# Patient Record
Sex: FEMALE | Race: OTHER | Employment: OTHER | URBAN - METROPOLITAN AREA
[De-identification: names, ages, dates, MRNs, and addresses within clinical notes are randomized per-mention and may not be internally consistent; named-entity substitution may affect disease eponyms.]

---

## 2017-12-06 ENCOUNTER — ESTABLISHED COMPREHENSIVE EXAM (OUTPATIENT)
Dept: URBAN - METROPOLITAN AREA CLINIC 46 | Facility: CLINIC | Age: 72
End: 2017-12-06

## 2017-12-06 DIAGNOSIS — H40.1133: ICD-10-CM

## 2017-12-06 DIAGNOSIS — E11.9: ICD-10-CM

## 2017-12-06 DIAGNOSIS — H25.813: ICD-10-CM

## 2017-12-06 PROCEDURE — 92083 EXTENDED VISUAL FIELD XM: CPT

## 2017-12-06 PROCEDURE — 92014 COMPRE OPH EXAM EST PT 1/>: CPT

## 2017-12-06 PROCEDURE — 92134 CPTRZ OPH DX IMG PST SGM RTA: CPT

## 2017-12-06 PROCEDURE — 92015 DETERMINE REFRACTIVE STATE: CPT

## 2017-12-06 ASSESSMENT — VISUAL ACUITY
OS_SC: J5
OD_SC: J5
OD_BAT: 20/200
OD_SC: 20/40
OS_BAT: 20/200
OS_SC: 20/40-1

## 2017-12-06 ASSESSMENT — TONOMETRY
OD_IOP_MMHG: 11
OS_IOP_MMHG: 11

## 2018-04-17 NOTE — PATIENT DISCUSSION
Patient educated with [employer] for distance, they will need glasses for all near and intermediate activities after surgery. Patient educated there is a 10% chance of needing enhancement after surgery. Patient is a candidate for all vision options and elects [facility] OS, goal of emmetropia.

## 2018-04-17 NOTE — PATIENT DISCUSSION
Patient educated on [employer] blended vision. Patient understands that the near focal point will be at approximately 20 inches with the first eye. Near vision will be achieved with surgery on the second eye. Patient educated they may experience halos at night that are typically resolved without therapy. Patient educated there is a 1 in 500 chance there will be something about the vision that they do not like. Patient educated there is a 10% chance of needing enhancement after surgery. If patient elects [employer] OS, goal will be emmetropia.

## 2018-04-17 NOTE — PATIENT DISCUSSION
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 The patient was advised that there will be a necessary period of adaptation to this new vision and they will miss their unaided reading vision. Patient states that she never takes her glasses off for anything near, that she always wears them. The patient understands and chooses good uncorrected distance vision with the possibility of a refractive surprise.

## 2018-05-02 ENCOUNTER — IOP CHECK (OUTPATIENT)
Dept: URBAN - METROPOLITAN AREA CLINIC 46 | Facility: CLINIC | Age: 73
End: 2018-05-02

## 2018-05-02 DIAGNOSIS — H40.1133: ICD-10-CM

## 2018-05-02 DIAGNOSIS — H40.033: ICD-10-CM

## 2018-05-02 PROCEDURE — 92012 INTRM OPH EXAM EST PATIENT: CPT

## 2018-05-02 RX ORDER — BRIMONIDINE TARTRATE 1 MG/ML: 1 SOLUTION/ DROPS OPHTHALMIC TWICE A DAY

## 2018-05-02 ASSESSMENT — TONOMETRY
OD_IOP_MMHG: 17
OS_IOP_MMHG: 20

## 2018-05-02 ASSESSMENT — VISUAL ACUITY
OS_SC: 20/30-2
OD_SC: 20/30-2

## 2018-07-06 ENCOUNTER — IOP CHECK (OUTPATIENT)
Dept: URBAN - METROPOLITAN AREA CLINIC 46 | Facility: CLINIC | Age: 73
End: 2018-07-06

## 2018-07-06 DIAGNOSIS — H40.033: ICD-10-CM

## 2018-07-06 DIAGNOSIS — H40.1133: ICD-10-CM

## 2018-07-06 PROCEDURE — 92133 CPTRZD OPH DX IMG PST SGM ON: CPT

## 2018-07-06 PROCEDURE — 92012 INTRM OPH EXAM EST PATIENT: CPT

## 2018-07-06 ASSESSMENT — VISUAL ACUITY
OS_SC: 20/30
OD_SC: 20/30

## 2018-07-06 ASSESSMENT — TONOMETRY
OS_IOP_MMHG: 17
OD_IOP_MMHG: 15

## 2018-07-09 NOTE — PATIENT DISCUSSION
lengthy discussion of near range limitations with Glendy, goal -0.50.  The patient would like better near range with vision right eye and has elected TMF IOL OD.  The patient understands she will move her computer closer if needed for her computer work at [employer] and with her computer games.

## 2018-10-29 NOTE — PROCEDURE NOTE: SURGICAL
<p>Prior to commencing surgery patient identification, surgical procedure, site, and side were confirmed by Dr. Ankit Marrero. Following topical proparacaine anesthesia, the patient was positioned at the YAG laser, a contact lens coupled to the cornea with methylcellulose and an axial posterior capsulotomy performed without complication using 3.4 Mj x 15. Excess methylcellulose was washed from the eye, one drop of Alphagan was instilled and the patient returned to the holding area having tolerated the procedure well and without complication. </p><p>MRN [id number]</p>

## 2019-01-04 ENCOUNTER — FOLLOW UP (OUTPATIENT)
Dept: URBAN - METROPOLITAN AREA CLINIC 46 | Facility: CLINIC | Age: 74
End: 2019-01-04

## 2019-01-04 DIAGNOSIS — H40.033: ICD-10-CM

## 2019-01-04 DIAGNOSIS — H40.1133: ICD-10-CM

## 2019-01-04 PROCEDURE — 92012 INTRM OPH EXAM EST PATIENT: CPT

## 2019-01-04 PROCEDURE — 92083 EXTENDED VISUAL FIELD XM: CPT

## 2019-01-04 ASSESSMENT — TONOMETRY
OS_IOP_MMHG: 17
OD_IOP_MMHG: 17

## 2019-01-04 ASSESSMENT — VISUAL ACUITY
OD_SC: 20/30-2
OS_PH: 20/25
OS_SC: 20/30-1
OD_PH: 20/25

## 2019-03-01 ENCOUNTER — CONSULT (OUTPATIENT)
Dept: URBAN - METROPOLITAN AREA CLINIC 46 | Facility: CLINIC | Age: 74
End: 2019-03-01

## 2019-03-01 VITALS
HEART RATE: 65 BPM | RESPIRATION RATE: 14 BRPM | HEIGHT: 55 IN | SYSTOLIC BLOOD PRESSURE: 158 MMHG | DIASTOLIC BLOOD PRESSURE: 82 MMHG

## 2019-03-01 DIAGNOSIS — H40.1133: ICD-10-CM

## 2019-03-01 DIAGNOSIS — H40.033: ICD-10-CM

## 2019-03-01 PROCEDURE — 92014 COMPRE OPH EXAM EST PT 1/>: CPT

## 2019-03-01 PROCEDURE — 92132 CPTRZD OPH DX IMG ANT SGM: CPT

## 2019-03-01 PROCEDURE — 92020 GONIOSCOPY: CPT

## 2019-03-01 PROCEDURE — 92250 FUNDUS PHOTOGRAPHY W/I&R: CPT

## 2019-03-01 PROCEDURE — 9222650 BILAT EXTENDED OPHTHALMOSCOPY, F/U

## 2019-03-01 ASSESSMENT — TONOMETRY
OD_IOP_MMHG: 10
OS_IOP_MMHG: 18

## 2019-03-01 ASSESSMENT — VISUAL ACUITY
OS_SC: J8
OS_SC: 20/30-2
OS_CC: J2
OD_SC: J12
OD_CC: J1+
OD_SC: 20/20-2

## 2019-05-02 ENCOUNTER — CLINIC PROCEDURE ONLY (OUTPATIENT)
Dept: URBAN - METROPOLITAN AREA CLINIC 43 | Facility: CLINIC | Age: 74
End: 2019-05-02

## 2019-05-02 DIAGNOSIS — H40.1133: ICD-10-CM

## 2019-05-02 PROCEDURE — 6585550 LASER TRABECULOPLASTY

## 2019-05-02 RX ORDER — PREDNISOLONE ACETATE 10 MG/ML: 1 SUSPENSION/ DROPS OPHTHALMIC

## 2019-05-02 ASSESSMENT — VISUAL ACUITY
OD_SC: 20/50-1
OS_SC: 20/50-2
OS_PH: 20/40
OD_PH: 20/30-2

## 2019-05-02 ASSESSMENT — TONOMETRY
OS_IOP_MMHG: 18
OD_IOP_MMHG: 11

## 2019-06-03 ENCOUNTER — IOP CHECK (OUTPATIENT)
Dept: URBAN - METROPOLITAN AREA CLINIC 46 | Facility: CLINIC | Age: 74
End: 2019-06-03

## 2019-06-03 DIAGNOSIS — H40.1133: ICD-10-CM

## 2019-06-03 DIAGNOSIS — H40.033: ICD-10-CM

## 2019-06-03 DIAGNOSIS — Z98.890: ICD-10-CM

## 2019-06-03 PROCEDURE — 92012 INTRM OPH EXAM EST PATIENT: CPT

## 2019-06-03 ASSESSMENT — VISUAL ACUITY
OD_SC: 20/40-2
OS_SC: 20/40-1

## 2019-06-03 ASSESSMENT — TONOMETRY
OD_IOP_MMHG: 12
OS_IOP_MMHG: 13

## 2020-01-08 ENCOUNTER — ESTABLISHED COMPREHENSIVE EXAM (OUTPATIENT)
Dept: URBAN - METROPOLITAN AREA CLINIC 46 | Facility: CLINIC | Age: 75
End: 2020-01-08

## 2020-01-08 DIAGNOSIS — H25.813: ICD-10-CM

## 2020-01-08 DIAGNOSIS — H52.7: ICD-10-CM

## 2020-01-08 DIAGNOSIS — H40.033: ICD-10-CM

## 2020-01-08 DIAGNOSIS — H40.1133: ICD-10-CM

## 2020-01-08 PROCEDURE — 92014 COMPRE OPH EXAM EST PT 1/>: CPT

## 2020-01-08 PROCEDURE — 92083 EXTENDED VISUAL FIELD XM: CPT

## 2020-01-08 PROCEDURE — 92133 CPTRZD OPH DX IMG PST SGM ON: CPT

## 2020-01-08 PROCEDURE — 92015 DETERMINE REFRACTIVE STATE: CPT

## 2020-01-08 RX ORDER — BRIMONIDINE TARTRATE 2 MG/MG: 1 SOLUTION/ DROPS OPHTHALMIC TWICE A DAY

## 2020-01-08 ASSESSMENT — VISUAL ACUITY
OD_SC: 20/40-1
OS_SC: 20/40-2
OD_SC: J3 STRAIN
OS_SC: J5

## 2020-01-08 ASSESSMENT — TONOMETRY
OD_IOP_MMHG: 15
OS_IOP_MMHG: 16

## 2020-02-05 ENCOUNTER — TECH ONLY (OUTPATIENT)
Dept: URBAN - METROPOLITAN AREA CLINIC 46 | Facility: CLINIC | Age: 75
End: 2020-02-05

## 2020-02-05 DIAGNOSIS — H40.1133: ICD-10-CM

## 2020-02-05 DIAGNOSIS — H25.813: ICD-10-CM

## 2020-02-05 PROCEDURE — 92133 CPTRZD OPH DX IMG PST SGM ON: CPT

## 2020-02-05 PROCEDURE — 92083 EXTENDED VISUAL FIELD XM: CPT

## 2020-02-05 PROCEDURE — 99211T TECH SERVICE

## 2020-07-27 ENCOUNTER — ESTABLISHED COMPREHENSIVE EXAM (OUTPATIENT)
Dept: URBAN - METROPOLITAN AREA CLINIC 46 | Facility: CLINIC | Age: 75
End: 2020-07-27

## 2020-07-27 DIAGNOSIS — E11.9: ICD-10-CM

## 2020-07-27 DIAGNOSIS — H52.7: ICD-10-CM

## 2020-07-27 PROCEDURE — 92014 COMPRE OPH EXAM EST PT 1/>: CPT

## 2020-07-27 PROCEDURE — 92015 DETERMINE REFRACTIVE STATE: CPT

## 2020-07-27 ASSESSMENT — TONOMETRY
OS_IOP_MMHG: 17
OD_IOP_MMHG: 17

## 2020-07-27 ASSESSMENT — VISUAL ACUITY
OS_SC: 20/40
OD_SC: J5
OS_SC: J5
OD_SC: 20/40-2

## 2021-01-27 ENCOUNTER — ESTABLISHED PATIENT (OUTPATIENT)
Dept: URBAN - METROPOLITAN AREA CLINIC 46 | Facility: CLINIC | Age: 76
End: 2021-01-27

## 2021-01-27 DIAGNOSIS — H25.813: ICD-10-CM

## 2021-01-27 DIAGNOSIS — H40.1133: ICD-10-CM

## 2021-01-27 DIAGNOSIS — H40.033: ICD-10-CM

## 2021-01-27 DIAGNOSIS — E11.9: ICD-10-CM

## 2021-01-27 PROCEDURE — 92133 CPTRZD OPH DX IMG PST SGM ON: CPT

## 2021-01-27 PROCEDURE — 92083 EXTENDED VISUAL FIELD XM: CPT

## 2021-01-27 PROCEDURE — 92012 INTRM OPH EXAM EST PATIENT: CPT

## 2021-01-27 ASSESSMENT — VISUAL ACUITY
OU_SC: 20/30-1
OD_SC: 20/40-1
OS_SC: 20/30-2

## 2021-01-27 ASSESSMENT — TONOMETRY
OS_IOP_MMHG: 15
OD_IOP_MMHG: 16

## 2021-09-03 ENCOUNTER — ESTABLISHED COMPREHENSIVE EXAM (OUTPATIENT)
Dept: URBAN - METROPOLITAN AREA CLINIC 46 | Facility: CLINIC | Age: 76
End: 2021-09-03

## 2021-09-03 DIAGNOSIS — H40.1133: ICD-10-CM

## 2021-09-03 DIAGNOSIS — H40.033: ICD-10-CM

## 2021-09-03 PROCEDURE — 92250 FUNDUS PHOTOGRAPHY W/I&R: CPT

## 2021-09-03 PROCEDURE — 92014 COMPRE OPH EXAM EST PT 1/>: CPT

## 2021-09-03 ASSESSMENT — VISUAL ACUITY
OD_CC: J1-
OD_SC: J12
OD_PH: 20/30-1
OS_SC: J10
OS_PH: 20/30-2
OD_SC: 20/40
OS_CC: J1-
OS_SC: 20/40+1

## 2021-09-03 ASSESSMENT — TONOMETRY
OD_IOP_MMHG: 12
OS_IOP_MMHG: 12

## 2022-02-04 ENCOUNTER — FOLLOW UP (OUTPATIENT)
Dept: URBAN - METROPOLITAN AREA CLINIC 46 | Facility: CLINIC | Age: 77
End: 2022-02-04

## 2022-02-04 DIAGNOSIS — H40.033: ICD-10-CM

## 2022-02-04 DIAGNOSIS — H25.813: ICD-10-CM

## 2022-02-04 DIAGNOSIS — H40.1133: ICD-10-CM

## 2022-02-04 PROCEDURE — 92133 CPTRZD OPH DX IMG PST SGM ON: CPT

## 2022-02-04 PROCEDURE — 92012 INTRM OPH EXAM EST PATIENT: CPT

## 2022-02-04 RX ORDER — LATANOPROST 50 UG/ML: 1 SOLUTION/ DROPS OPHTHALMIC EVERY EVENING

## 2022-02-04 ASSESSMENT — VISUAL ACUITY
OD_SC: 20/40+2
OS_SC: 20/40+2

## 2022-02-04 ASSESSMENT — TONOMETRY
OD_IOP_MMHG: 13
OS_IOP_MMHG: 13

## 2022-03-17 ENCOUNTER — EMERGENCY VISIT (OUTPATIENT)
Dept: URBAN - METROPOLITAN AREA CLINIC 46 | Facility: CLINIC | Age: 77
End: 2022-03-17

## 2022-03-17 DIAGNOSIS — H57.12: ICD-10-CM

## 2022-03-17 DIAGNOSIS — H18.832: ICD-10-CM

## 2022-03-17 PROCEDURE — 92071 CONTACT LENS FITTING FOR TX: CPT

## 2022-03-17 PROCEDURE — 92012 INTRM OPH EXAM EST PATIENT: CPT

## 2022-03-17 RX ORDER — MOXIFLOXACIN HYDROCHLORIDE 5 MG/ML
1 SOLUTION/ DROPS OPHTHALMIC
Start: 2022-03-17

## 2022-03-17 ASSESSMENT — VISUAL ACUITY
OS_PH: 20/30-2
OS_SC: 20/50+2
OD_PH: 20/30-2
OD_SC: 20/40+1

## 2022-03-21 ENCOUNTER — FOLLOW UP (OUTPATIENT)
Dept: URBAN - METROPOLITAN AREA CLINIC 46 | Facility: CLINIC | Age: 77
End: 2022-03-21

## 2022-03-21 DIAGNOSIS — H18.832: ICD-10-CM

## 2022-03-21 DIAGNOSIS — H57.12: ICD-10-CM

## 2022-03-21 PROCEDURE — 92012 INTRM OPH EXAM EST PATIENT: CPT

## 2022-03-21 RX ORDER — SODIUM CHLORIDE 50 MG/G
OINTMENT OPHTHALMIC EVERY EVENING
Start: 2022-03-21

## 2022-03-21 RX ORDER — OLOPATADINE HYDROCHLORIDE 2 MG/ML: 1 SOLUTION OPHTHALMIC ONCE A DAY

## 2022-03-21 RX ORDER — MOXIFLOXACIN HYDROCHLORIDE 5 MG/ML
1 SOLUTION/ DROPS OPHTHALMIC
End: 2022-03-21

## 2022-03-21 ASSESSMENT — VISUAL ACUITY
OS_SC: 20/50-1
OD_SC: 20/50

## 2022-06-10 ENCOUNTER — FOLLOW UP (OUTPATIENT)
Dept: URBAN - METROPOLITAN AREA CLINIC 46 | Facility: CLINIC | Age: 77
End: 2022-06-10

## 2022-06-10 DIAGNOSIS — H57.12: ICD-10-CM

## 2022-06-10 DIAGNOSIS — H40.1133: ICD-10-CM

## 2022-06-10 PROCEDURE — 92012 INTRM OPH EXAM EST PATIENT: CPT

## 2022-06-10 PROCEDURE — 92083 EXTENDED VISUAL FIELD XM: CPT

## 2022-06-10 ASSESSMENT — TONOMETRY
OS_IOP_MMHG: 15
OD_IOP_MMHG: 15

## 2022-06-10 ASSESSMENT — VISUAL ACUITY
OD_SC: 20/50+2
OS_SC: 20/40-2

## 2022-08-11 ENCOUNTER — CLINIC PROCEDURE ONLY (OUTPATIENT)
Dept: URBAN - METROPOLITAN AREA CLINIC 43 | Facility: CLINIC | Age: 77
End: 2022-08-11

## 2022-08-11 DIAGNOSIS — H40.1133: ICD-10-CM

## 2022-08-11 PROCEDURE — 6585550 LASER TRABECULOPLASTY

## 2022-08-11 RX ORDER — PREDNISOLONE ACETATE 10 MG/ML: 1 SUSPENSION/ DROPS OPHTHALMIC

## 2022-08-11 ASSESSMENT — VISUAL ACUITY
OD_PH: 20/30-2
OS_SC: 20/40
OD_SC: 20/40

## 2022-08-11 ASSESSMENT — TONOMETRY
OD_IOP_MMHG: 12
OS_IOP_MMHG: 13

## 2022-09-08 ENCOUNTER — PREPPED CHART (OUTPATIENT)
Dept: URBAN - METROPOLITAN AREA CLINIC 46 | Facility: CLINIC | Age: 77
End: 2022-09-08

## 2022-11-04 ENCOUNTER — FOLLOW UP (OUTPATIENT)
Dept: URBAN - METROPOLITAN AREA CLINIC 47 | Facility: CLINIC | Age: 77
End: 2022-11-04

## 2022-11-04 DIAGNOSIS — H40.1133: ICD-10-CM

## 2022-11-04 PROCEDURE — 92012 INTRM OPH EXAM EST PATIENT: CPT

## 2022-11-04 RX ORDER — LATANOPROST 50 UG/ML: 1 SOLUTION/ DROPS OPHTHALMIC EVERY EVENING

## 2022-11-04 ASSESSMENT — TONOMETRY
OS_IOP_MMHG: 14
OD_IOP_MMHG: 13
OD_IOP_MMHG: 13
OS_IOP_MMHG: 08

## 2022-11-04 ASSESSMENT — VISUAL ACUITY
OS_SC: 20/40+2
OD_SC: 20/30

## 2024-02-16 ENCOUNTER — COMPREHENSIVE EXAM (OUTPATIENT)
Dept: URBAN - METROPOLITAN AREA CLINIC 46 | Facility: CLINIC | Age: 79
End: 2024-02-16

## 2024-02-16 DIAGNOSIS — E11.9: ICD-10-CM

## 2024-02-16 DIAGNOSIS — H40.033: ICD-10-CM

## 2024-02-16 DIAGNOSIS — H25.813: ICD-10-CM

## 2024-02-16 DIAGNOSIS — H40.1133: ICD-10-CM

## 2024-02-16 PROCEDURE — 92250 FUNDUS PHOTOGRAPHY W/I&R: CPT

## 2024-02-16 PROCEDURE — 92014 COMPRE OPH EXAM EST PT 1/>: CPT

## 2024-02-16 ASSESSMENT — TONOMETRY
OD_IOP_MMHG: 16
OS_IOP_MMHG: 17

## 2024-02-16 ASSESSMENT — VISUAL ACUITY
OD_SC: J5
OS_SC: J3
OS_CC: J2
OS_SC: 20/30
OD_PH: 20/30
OD_SC: 20/40-1
OD_CC: J1

## 2024-03-21 ENCOUNTER — FOLLOW UP (OUTPATIENT)
Dept: URBAN - METROPOLITAN AREA CLINIC 46 | Facility: CLINIC | Age: 79
End: 2024-03-21

## 2024-03-21 DIAGNOSIS — H52.7: ICD-10-CM

## 2024-03-21 DIAGNOSIS — H40.1133: ICD-10-CM

## 2024-03-21 PROCEDURE — 92012 INTRM OPH EXAM EST PATIENT: CPT

## 2024-03-21 PROCEDURE — 92015 DETERMINE REFRACTIVE STATE: CPT

## 2024-03-21 ASSESSMENT — VISUAL ACUITY
OD_SC: 20/25-1
OS_SC: J8
OS_SC: 20/30-2
OD_SC: J6

## 2024-03-21 ASSESSMENT — TONOMETRY
OS_IOP_MMHG: 17
OD_IOP_MMHG: 17